# Patient Record
Sex: FEMALE | Race: WHITE | NOT HISPANIC OR LATINO | Employment: UNEMPLOYED | URBAN - METROPOLITAN AREA
[De-identification: names, ages, dates, MRNs, and addresses within clinical notes are randomized per-mention and may not be internally consistent; named-entity substitution may affect disease eponyms.]

---

## 2019-07-23 ENCOUNTER — HISTORICAL (OUTPATIENT)
Dept: RADIOLOGY | Facility: HOSPITAL | Age: 39
End: 2019-07-23

## 2019-08-14 LAB
ABS NEUT (OLG): 4.7 X10(3)/MCL (ref 1.5–6.9)
BUN SERPL-MCNC: 10 MG/DL (ref 10–20)
CALCIUM SERPL-MCNC: 9.1 MG/DL (ref 8–10.5)
CHLORIDE SERPL-SCNC: 101 MMOL/L (ref 100–108)
CO2 SERPL-SCNC: 27 MMOL/L (ref 21–35)
CREAT SERPL-MCNC: 0.85 MG/DL (ref 0.7–1.3)
CREAT/UREA NIT SERPL: 12
ERYTHROCYTE [DISTWIDTH] IN BLOOD BY AUTOMATED COUNT: 12.7 % (ref 11.5–17)
GLUCOSE SERPL-MCNC: 179 MG/DL (ref 75–116)
HCT VFR BLD AUTO: 40.1 % (ref 36–48)
HGB BLD-MCNC: 13.6 GM/DL (ref 12–16)
MCH RBC QN AUTO: 32 PG (ref 27–34)
MCHC RBC AUTO-ENTMCNC: 34 GM/DL (ref 31–36)
MCV RBC AUTO: 95 FL (ref 80–99)
PLATELET # BLD AUTO: 230 X10(3)/MCL (ref 140–400)
PMV BLD AUTO: 9.4 FL (ref 6.8–10)
POTASSIUM SERPL-SCNC: 3.8 MMOL/L (ref 3.6–5.2)
RBC # BLD AUTO: 4.21 X10(6)/MCL (ref 4.2–5.4)
SODIUM SERPL-SCNC: 138 MMOL/L (ref 135–145)
WBC # SPEC AUTO: 8.6 X10(3)/MCL (ref 4.5–11.5)

## 2019-08-21 ENCOUNTER — HISTORICAL (OUTPATIENT)
Dept: ANESTHESIOLOGY | Facility: HOSPITAL | Age: 39
End: 2019-08-21

## 2019-08-21 LAB
APPEARANCE, UA: CLEAR
B-HCG SERPL QL: NEGATIVE
BILIRUB UR QL STRIP: NEGATIVE
COLOR UR: ABNORMAL
GLUCOSE (UA): 500 MG/DL
HGB UR QL STRIP: NEGATIVE
KETONES UR QL STRIP: NEGATIVE
LEUKOCYTE ESTERASE UR QL STRIP: NEGATIVE
NITRITE UR QL STRIP: NEGATIVE
PH UR STRIP: 6 [PH]
PROT UR QL STRIP: NEGATIVE
SP GR UR STRIP: >=1.03
UROBILINOGEN UR STRIP-ACNC: 0.2 EU/DL

## 2021-01-25 LAB — POC BETA-HCG (QUAL): NEGATIVE

## 2021-10-23 ENCOUNTER — HISTORICAL (OUTPATIENT)
Dept: ADMINISTRATIVE | Facility: HOSPITAL | Age: 41
End: 2021-10-23

## 2022-01-25 LAB — PAP RECOMMENDATION EXT: NORMAL

## 2022-04-10 ENCOUNTER — HISTORICAL (OUTPATIENT)
Dept: ADMINISTRATIVE | Facility: HOSPITAL | Age: 42
End: 2022-04-10

## 2022-04-26 VITALS
WEIGHT: 249.13 LBS | BODY MASS INDEX: 41.51 KG/M2 | HEIGHT: 65 IN | DIASTOLIC BLOOD PRESSURE: 70 MMHG | SYSTOLIC BLOOD PRESSURE: 131 MMHG

## 2022-09-20 ENCOUNTER — HISTORICAL (OUTPATIENT)
Dept: ADMINISTRATIVE | Facility: HOSPITAL | Age: 42
End: 2022-09-20

## 2023-01-26 ENCOUNTER — DOCUMENTATION ONLY (OUTPATIENT)
Dept: ADMINISTRATIVE | Facility: HOSPITAL | Age: 43
End: 2023-01-26
Payer: MEDICAID

## 2023-02-10 DIAGNOSIS — R76.8 POSITIVE ANA (ANTINUCLEAR ANTIBODY): Primary | ICD-10-CM

## 2023-03-02 ENCOUNTER — DOCUMENTATION ONLY (OUTPATIENT)
Dept: OBSTETRICS AND GYNECOLOGY | Facility: CLINIC | Age: 43
End: 2023-03-02

## 2023-03-02 ENCOUNTER — OFFICE VISIT (OUTPATIENT)
Dept: OBSTETRICS AND GYNECOLOGY | Facility: CLINIC | Age: 43
End: 2023-03-02
Payer: MEDICAID

## 2023-03-02 VITALS
SYSTOLIC BLOOD PRESSURE: 132 MMHG | HEART RATE: 66 BPM | RESPIRATION RATE: 18 BRPM | BODY MASS INDEX: 42.42 KG/M2 | HEIGHT: 65 IN | WEIGHT: 254.63 LBS | DIASTOLIC BLOOD PRESSURE: 80 MMHG | OXYGEN SATURATION: 97 %

## 2023-03-02 DIAGNOSIS — N39.46 MIXED INCONTINENCE URGE AND STRESS: ICD-10-CM

## 2023-03-02 DIAGNOSIS — Z01.419 WOMEN'S ANNUAL ROUTINE GYNECOLOGICAL EXAMINATION: Primary | ICD-10-CM

## 2023-03-02 PROCEDURE — 99396 PR PREVENTIVE VISIT,EST,40-64: ICD-10-PCS | Mod: ,,, | Performed by: OBSTETRICS & GYNECOLOGY

## 2023-03-02 PROCEDURE — 3008F BODY MASS INDEX DOCD: CPT | Mod: CPTII,,, | Performed by: OBSTETRICS & GYNECOLOGY

## 2023-03-02 PROCEDURE — 3075F SYST BP GE 130 - 139MM HG: CPT | Mod: CPTII,,, | Performed by: OBSTETRICS & GYNECOLOGY

## 2023-03-02 PROCEDURE — 3075F PR MOST RECENT SYSTOLIC BLOOD PRESS GE 130-139MM HG: ICD-10-PCS | Mod: CPTII,,, | Performed by: OBSTETRICS & GYNECOLOGY

## 2023-03-02 PROCEDURE — 3008F PR BODY MASS INDEX (BMI) DOCUMENTED: ICD-10-PCS | Mod: CPTII,,, | Performed by: OBSTETRICS & GYNECOLOGY

## 2023-03-02 PROCEDURE — 99396 PREV VISIT EST AGE 40-64: CPT | Mod: ,,, | Performed by: OBSTETRICS & GYNECOLOGY

## 2023-03-02 PROCEDURE — 1159F PR MEDICATION LIST DOCUMENTED IN MEDICAL RECORD: ICD-10-PCS | Mod: CPTII,,, | Performed by: OBSTETRICS & GYNECOLOGY

## 2023-03-02 PROCEDURE — 4010F ACE/ARB THERAPY RXD/TAKEN: CPT | Mod: CPTII,,, | Performed by: OBSTETRICS & GYNECOLOGY

## 2023-03-02 PROCEDURE — 1160F RVW MEDS BY RX/DR IN RCRD: CPT | Mod: CPTII,,, | Performed by: OBSTETRICS & GYNECOLOGY

## 2023-03-02 PROCEDURE — 1160F PR REVIEW ALL MEDS BY PRESCRIBER/CLIN PHARMACIST DOCUMENTED: ICD-10-PCS | Mod: CPTII,,, | Performed by: OBSTETRICS & GYNECOLOGY

## 2023-03-02 PROCEDURE — 1159F MED LIST DOCD IN RCRD: CPT | Mod: CPTII,,, | Performed by: OBSTETRICS & GYNECOLOGY

## 2023-03-02 PROCEDURE — 3079F DIAST BP 80-89 MM HG: CPT | Mod: CPTII,,, | Performed by: OBSTETRICS & GYNECOLOGY

## 2023-03-02 PROCEDURE — 3079F PR MOST RECENT DIASTOLIC BLOOD PRESSURE 80-89 MM HG: ICD-10-PCS | Mod: CPTII,,, | Performed by: OBSTETRICS & GYNECOLOGY

## 2023-03-02 PROCEDURE — 87491 CHLMYD TRACH DNA AMP PROBE: CPT | Performed by: OBSTETRICS & GYNECOLOGY

## 2023-03-02 PROCEDURE — 4010F PR ACE/ARB THEARPY RXD/TAKEN: ICD-10-PCS | Mod: CPTII,,, | Performed by: OBSTETRICS & GYNECOLOGY

## 2023-03-02 RX ORDER — CETIRIZINE HYDROCHLORIDE 10 MG/1
10 TABLET ORAL
COMMUNITY
Start: 2022-09-16 | End: 2024-03-20

## 2023-03-02 RX ORDER — TRAZODONE HYDROCHLORIDE 100 MG/1
200 TABLET ORAL NIGHTLY
COMMUNITY
Start: 2023-02-27

## 2023-03-02 RX ORDER — AMLODIPINE BESYLATE 10 MG/1
10 TABLET ORAL
COMMUNITY
Start: 2023-02-27

## 2023-03-02 RX ORDER — METFORMIN HYDROCHLORIDE 500 MG/1
500 TABLET, EXTENDED RELEASE ORAL NIGHTLY
COMMUNITY
Start: 2023-02-08

## 2023-03-02 RX ORDER — METOPROLOL SUCCINATE 25 MG/1
25 TABLET, EXTENDED RELEASE ORAL
COMMUNITY
Start: 2023-02-08

## 2023-03-02 RX ORDER — OMEPRAZOLE 40 MG/1
40 CAPSULE, DELAYED RELEASE ORAL
COMMUNITY
Start: 2023-02-09

## 2023-03-02 RX ORDER — LOSARTAN POTASSIUM 100 MG/1
100 TABLET ORAL
COMMUNITY
Start: 2023-02-27

## 2023-03-02 RX ORDER — ASENAPINE MALEATE 5 MG/1
TABLET SUBLINGUAL
COMMUNITY
Start: 2023-02-14

## 2023-03-02 RX ORDER — ATORVASTATIN CALCIUM 10 MG/1
10 TABLET, FILM COATED ORAL
COMMUNITY
Start: 2023-02-09

## 2023-03-02 RX ORDER — CLONAZEPAM 1 MG/1
1.5 TABLET ORAL 2 TIMES DAILY
COMMUNITY
Start: 2023-02-27

## 2023-03-02 RX ORDER — VORTIOXETINE 10 MG/1
1 TABLET, FILM COATED ORAL EVERY MORNING
COMMUNITY
Start: 2023-02-08

## 2023-03-02 RX ORDER — BREXPIPRAZOLE 2 MG/1
1 TABLET ORAL
COMMUNITY
Start: 2023-02-03

## 2023-03-02 RX ORDER — FAMOTIDINE 40 MG/1
40 TABLET, FILM COATED ORAL NIGHTLY
COMMUNITY
Start: 2023-02-08

## 2023-03-02 RX ORDER — BUPROPION HYDROCHLORIDE 300 MG/1
300 TABLET ORAL EVERY MORNING
COMMUNITY
Start: 2023-02-27

## 2023-03-02 RX ORDER — TIRZEPATIDE 2.5 MG/.5ML
INJECTION, SOLUTION SUBCUTANEOUS
COMMUNITY
Start: 2023-02-07 | End: 2024-03-20

## 2023-03-02 RX ORDER — ASENAPINE MALEATE 2.5 MG/1
1 TABLET SUBLINGUAL
COMMUNITY
Start: 2023-02-17

## 2023-03-02 NOTE — PROGRESS NOTES
Patient ID: [id number]   Chief Complaint: Annual exam  Chief Complaint   Patient presents with    Well Woman     Present to clinic for annual exam . C/o urinary stress incontinence.       HPI:   Maricel Richards is a 42 y.o. year old  here for her Annual Exam. No LMP recorded. Patient has had an ablation.  C/O urinary stress incontinence, when coughs, sneezes, laughs. But also, he now had urge symptoms, too. She will often have accidents if she cannot get to the bathroom in time.  Denies any uti symptoms.    Past Medical History:   Diagnosis Date    Diabetes mellitus     Hypertension     Mental disorder      Past Surgical History:   Procedure Laterality Date    ABLATION OF ENDOMETRIUM USING RESECTOSCOPE      tonsilectomy      TUBAL LIGATION       Social History     Tobacco Use    Smoking status: Former     Types: Cigarettes    Smokeless tobacco: Former   Substance Use Topics    Drug use: Never     Family History   Problem Relation Age of Onset    ALS Father     Hypertension Mother     Heart disease Mother      OB History    Para Term  AB Living   2 2 2     2   SAB IAB Ectopic Multiple Live Births           2      # Outcome Date GA Lbr Ramon/2nd Weight Sex Delivery Anes PTL Lv   2 Term 07/10/11 38w0d  3.459 kg (7 lb 10 oz) M Vag-Spont None  PEPITO   1 Term 00 38w0d  1.814 kg (4 lb) F Vag-Spont None  PEPITO       Current Outpatient Medications:     amLODIPine (NORVASC) 10 MG tablet, Take 10 mg by mouth., Disp: , Rfl:     asenapine maleate 2.5 mg Subl, Place 1 tablet under the tongue., Disp: , Rfl:     atorvastatin (LIPITOR) 10 MG tablet, Take 10 mg by mouth., Disp: , Rfl:     buPROPion (WELLBUTRIN XL) 300 MG 24 hr tablet, Take 300 mg by mouth every morning., Disp: , Rfl:     cetirizine (ZYRTEC) 10 MG tablet, Take 10 mg by mouth., Disp: , Rfl:     clonazePAM (KLONOPIN) 1 MG tablet, Take 1.5 mg by mouth 2 (two) times daily., Disp: , Rfl:     famotidine (PEPCID) 40 MG tablet, Take 40 mg by mouth every  evening., Disp: , Rfl:     losartan (COZAAR) 100 MG tablet, Take 100 mg by mouth., Disp: , Rfl:     metFORMIN (GLUCOPHAGE-XR) 500 MG ER 24hr tablet, Take 500 mg by mouth every evening., Disp: , Rfl:     metoprolol succinate (TOPROL-XL) 25 MG 24 hr tablet, Take 25 mg by mouth., Disp: , Rfl:     MOUNJARO 2.5 mg/0.5 mL PnIj, SMARTSI.5 Milligram(s) SUB-Q Once a Week, Disp: , Rfl:     omeprazole (PRILOSEC) 40 MG capsule, Take 40 mg by mouth., Disp: , Rfl:     REXULTI 2 mg Tab, Take 1 tablet by mouth., Disp: , Rfl:     SAPHRIS 5 mg Subl, SMARTSI Tablet(s) Sublingual Every Evening, Disp: , Rfl:     traZODone (DESYREL) 100 MG tablet, Take 200 mg by mouth every evening., Disp: , Rfl:     TRINTELLIX 10 mg Tab, Take 1 tablet by mouth every morning., Disp: , Rfl:     Subjective:   Review of Systems:   Review of Systems  General/Constitutional:  Hot flash denies . Chills denies. Fatigue/weakness denies . Fever denies . Night sweats denies .  Respiratory:  Cough denies . Hemoptysis denies . SOB denies . Sputum production denies . Wheezing denies .  Breast:  Changes in skin of nipple or breast denies . Breast lump denies. Breast pain denies. Nipple discharge denies .  Cardiovascular:  Chest pain denies . Dizziness denies . Palpitations denies . Swelling in hands/feet denies .   Gastrointestinal:  Abdominal pain denies . Blood in stool denies . Constipation denies . Diarrhea denies . Heartburn denies . Nausea denies . Vomiting denies .  Women Only:  Vaginal bleeding denies . Vaginal discharge/ Odor denies . Vaginal lesion/pain denies Breakthrough bleeding denies . Pelvic pain denies . Decreased libido denies. Vulvar lesion/ulcer denies . Prolapse of genital organs denies . Heavy bleeding during menses denies. Irregular menses denies . Painful intercourse denies . Painful menses denies .    Genitourinary:  Incontinence--admits . Blood in urine denies. Frequest urination denies . Painful urination  "denies.    Musculoskeletal:  Joint/arsenio pain denies. Decreased ROM denies. Muscle aches denies. Swollen joints denies . Weakness denies.  Neurologic:  Confusion denies. Trouble walking denies. Trouble with balance denies Balance difficulty denies. Gait abnormalities denies. Headache denies. Tingling/Numbness denies.  Psychiatric:  [place] denies. Homicidal thoughts denies. Mood lability denies. Personality changes denies. Anxiety or panic attacks denies. Auditory/visual hallucinations denies. Delusions denies. Depressed mood denies. Suicidal thoughts denies.    Objective:   Visit Vitals  /80 (BP Location: Left arm)   Pulse 66   Resp 18   Ht 5' 5" (1.651 m)   Wt 115.5 kg (254 lb 10.1 oz)   SpO2 97%   BMI 42.37 kg/m²     Physical Exam:  Physical Exam  Constitutional:  General Appearance : alert, in no acute distress, normal, well nourished.  Neck/Thyroid:  Inspection/Palpation: normal. Thyroid: normal size and shape.  Respiratory:  Auscultation: clear to auscultation bilaterally. Respiratory Effort: normal.  Breast:  Right: Inspection/palpation: no discharge, no masses present, no nipple retraction, no skin changes, no skin dimpling, no tenderness, no lymphadenopathy, no axillary mass, no axillary tenderness.  Left: Inspection/palpation: no discharge, no masses present, no nipple retraction, no skin changes, no skin dimpling, no tenderness, no lymphadenopathy, no axillary mass, no axillary tenderness.  Gastrointestinal:  Abdomen: no masses. no tender, nondistended.  Liver and spleen: normal  Hernias: no hernias present, no inguinal adenopathy.  Genitourinary:  External Genitalia: normal, no lesions.  Vagina: normal appearance, no abnormal discharge, no lesions.  Bladder: no mass, nontender.  Urethra: no erythema or lesions present.  Cervix: no lesions, non tender. Pap and Gen Probe collected  Uterus: nontender, normal contour, normal mobility, normal size.   Adnexa: no masses, no tenderness.  Anus and Perineum: " visually normal.   Chaperone Present  Assessment/Plan:   Assessment:     Women's annual routine gynecological examination  -     Pap IG (Image Guided)  -     C.trach/N.gonor AMP RNA  -     Mammo Digital Screening Bilat; Future  -     Mammo Digital Screening Bilat    Mixed incontinence urge and stress  -     Ambulatory referral/consult to Urology; Future      Follow up in about 1 year (around 3/2/2024) for ANNUAL. In addition to their scheduled FU, the patient has also been instructed to follow up on as needed basis    All questions were answered and the patient voiced understanding of the above issues.

## 2023-03-06 ENCOUNTER — DOCUMENTATION ONLY (OUTPATIENT)
Dept: OBSTETRICS AND GYNECOLOGY | Facility: CLINIC | Age: 43
End: 2023-03-06
Payer: MEDICAID

## 2023-03-06 ENCOUNTER — TELEPHONE (OUTPATIENT)
Dept: OBSTETRICS AND GYNECOLOGY | Facility: CLINIC | Age: 43
End: 2023-03-06
Payer: MEDICAID

## 2023-03-06 LAB
C TRACH RRNA SPEC QL NAA+PROBE: NEGATIVE
CYTOLOGIST CVX/VAG CYTO: NORMAL
CYTOLOGY CVX/VAG DOC CYTO: NORMAL
CYTOLOGY CVX/VAG DOC THIN PREP: NORMAL
DX ICD CODE: NORMAL
Lab: NORMAL
N GONORRHOEA RRNA SPEC QL NAA+PROBE: NEGATIVE
OTHER STN SPEC: NORMAL
SPECIMEN SOURCE: NORMAL
SPECIMEN SOURCE: NORMAL
STAT OF ADQ CVX/VAG CYTO-IMP: NORMAL

## 2023-03-06 NOTE — TELEPHONE ENCOUNTER
Called pt to inform Dr Emil Ocampo does not see Medicaid pt outside [facility]. Pt will need to call ins to find out who is on her plan and call us back for us to refer. Unable to leave .

## 2023-03-20 ENCOUNTER — DOCUMENTATION ONLY (OUTPATIENT)
Dept: OBSTETRICS AND GYNECOLOGY | Facility: CLINIC | Age: 43
End: 2023-03-20
Payer: MEDICAID

## 2023-08-16 DIAGNOSIS — R76.8 POSITIVE ANA (ANTINUCLEAR ANTIBODY): Primary | ICD-10-CM

## 2024-03-20 ENCOUNTER — OFFICE VISIT (OUTPATIENT)
Dept: OBSTETRICS AND GYNECOLOGY | Facility: CLINIC | Age: 44
End: 2024-03-20
Payer: MEDICAID

## 2024-03-20 VITALS
HEART RATE: 73 BPM | DIASTOLIC BLOOD PRESSURE: 82 MMHG | BODY MASS INDEX: 37.21 KG/M2 | WEIGHT: 223.63 LBS | SYSTOLIC BLOOD PRESSURE: 122 MMHG

## 2024-03-20 DIAGNOSIS — Z12.39 ENCOUNTER FOR SCREENING FOR MALIGNANT NEOPLASM OF BREAST, UNSPECIFIED SCREENING MODALITY: ICD-10-CM

## 2024-03-20 DIAGNOSIS — Z01.419 ENCOUNTER FOR WELL WOMAN EXAM WITH ROUTINE GYNECOLOGICAL EXAM: Primary | ICD-10-CM

## 2024-03-20 DIAGNOSIS — Z11.3 SCREENING FOR STDS (SEXUALLY TRANSMITTED DISEASES): ICD-10-CM

## 2024-03-20 DIAGNOSIS — N93.9 VAGINAL BLEEDING: ICD-10-CM

## 2024-03-20 DIAGNOSIS — N39.3 STRESS INCONTINENCE: ICD-10-CM

## 2024-03-20 PROCEDURE — 1159F MED LIST DOCD IN RCRD: CPT | Mod: CPTII,,, | Performed by: OBSTETRICS & GYNECOLOGY

## 2024-03-20 PROCEDURE — 3008F BODY MASS INDEX DOCD: CPT | Mod: CPTII,,, | Performed by: OBSTETRICS & GYNECOLOGY

## 2024-03-20 PROCEDURE — 1160F RVW MEDS BY RX/DR IN RCRD: CPT | Mod: CPTII,,, | Performed by: OBSTETRICS & GYNECOLOGY

## 2024-03-20 PROCEDURE — 99213 OFFICE O/P EST LOW 20 MIN: CPT | Mod: 25,,, | Performed by: OBSTETRICS & GYNECOLOGY

## 2024-03-20 PROCEDURE — 3079F DIAST BP 80-89 MM HG: CPT | Mod: CPTII,,, | Performed by: OBSTETRICS & GYNECOLOGY

## 2024-03-20 PROCEDURE — 3074F SYST BP LT 130 MM HG: CPT | Mod: CPTII,,, | Performed by: OBSTETRICS & GYNECOLOGY

## 2024-03-20 PROCEDURE — 4010F ACE/ARB THERAPY RXD/TAKEN: CPT | Mod: CPTII,,, | Performed by: OBSTETRICS & GYNECOLOGY

## 2024-03-20 PROCEDURE — 99396 PREV VISIT EST AGE 40-64: CPT | Mod: ,,, | Performed by: OBSTETRICS & GYNECOLOGY

## 2024-03-20 RX ORDER — TIRZEPATIDE 15 MG/.5ML
15 INJECTION, SOLUTION SUBCUTANEOUS
COMMUNITY
End: 2024-06-10

## 2024-03-20 NOTE — PROGRESS NOTES
Patient ID: [id number]   Chief Complaint: Annual exam  Chief Complaint   Patient presents with    Well Woman     PRESENTS TO [facility] FOR ANNUAL EXAM.  HAVING ISSUE WITH STRESS INCONTINENCE     HPI:   Maricel Richards is a 43 y.o. year old  here for her Annual Exam. No LMP recorded. Patient has had an ablation. She is doing well. Denies any health changes.   PRESENTS TO [facility] FOR ANNUAL EXAM. STATES SHE HAS TO WEAR A PANTY LINER BECAUSE WHEN SHE COUGHS, SHE WILL URINATE ON HERSELF SOME DUE TO WEAK PELVIC FLOOR MUSCLES.   Subjective:     Past Medical History:   Diagnosis Date    Diabetes mellitus     Hypertension     Mental disorder      Past Surgical History:   Procedure Laterality Date    ABLATION OF ENDOMETRIUM USING RESECTOSCOPE      tonsilectomy      TUBAL LIGATION       Social History     Tobacco Use    Smoking status: Former     Types: Cigarettes    Smokeless tobacco: Former   Substance Use Topics    Alcohol use: Yes     Comment: social    Drug use: Never     Family History   Problem Relation Age of Onset    ALS Father     Hypertension Mother     Heart disease Mother      OB History    Para Term  AB Living   2 2 2     2   SAB IAB Ectopic Multiple Live Births           2      # Outcome Date GA Lbr Ramon/2nd Weight Sex Delivery Anes PTL Lv   2 Term 07/10/11 38w0d  3.459 kg (7 lb 10 oz) M Vag-Spont None  PEPITO   1 Term 00 38w0d  1.814 kg (4 lb) F Vag-Spont None  PEPITO       Current Outpatient Medications:     amLODIPine (NORVASC) 10 MG tablet, Take 10 mg by mouth., Disp: , Rfl:     asenapine maleate 2.5 mg Subl, Place 1 tablet under the tongue., Disp: , Rfl:     atorvastatin (LIPITOR) 10 MG tablet, Take 10 mg by mouth., Disp: , Rfl:     buPROPion (WELLBUTRIN XL) 300 MG 24 hr tablet, Take 300 mg by mouth every morning., Disp: , Rfl:     clonazePAM (KLONOPIN) 1 MG tablet, Take 1.5 mg by mouth 2 (two) times daily., Disp: , Rfl:     famotidine (PEPCID) 40 MG tablet, Take 40 mg by mouth every evening.,  Disp: , Rfl:     losartan (COZAAR) 100 MG tablet, Take 100 mg by mouth., Disp: , Rfl:     metFORMIN (GLUCOPHAGE-XR) 500 MG ER 24hr tablet, Take 500 mg by mouth every evening., Disp: , Rfl:     metoprolol succinate (TOPROL-XL) 25 MG 24 hr tablet, Take 25 mg by mouth., Disp: , Rfl:     omeprazole (PRILOSEC) 40 MG capsule, Take 40 mg by mouth., Disp: , Rfl:     REXULTI 2 mg Tab, Take 1 tablet by mouth., Disp: , Rfl:     SAPHRIS 5 mg Subl, SMARTSI Tablet(s) Sublingual Every Evening, Disp: , Rfl:     tirzepatide (MOUNJARO) 15 mg/0.5 mL PnIj, Inject 15 mg into the skin every 7 days., Disp: , Rfl:     traZODone (DESYREL) 100 MG tablet, Take 200 mg by mouth every evening., Disp: , Rfl:     TRINTELLIX 10 mg Tab, Take 1 tablet by mouth every morning., Disp: , Rfl:   MENARCHEAL:  DOESN'T CYCLE, ABLATION APPROX 3 YEARS AGO  PAP:  Last PAP: 3/2/23  History of Abnormal PAP Smear: NO  HPV Vaccine: NO  INTERCOURSE:  Dyspareunia: No  Postcoital Bleeding: No  History of STI: Yes   If yes, then: TV APPROX 3-4 YEARS AGO  Current Birth Control Method: tubal ligation  Sexually Active: no  BREAST HISTORY:   Last Mammogram: 3/16/23  History of Abnormal Mammogram: YES: us OF BREAST-NORMAL     MENOPAUSE:  Post Menopausal Bleeding: No  Hormone Replacement Therapy: No  COLONOSCOPY:  Last Colonoscopy:  NA    Review of Systems 12 point review of systems conducted, negative except as stated in the history of present illness.   See HPI for details.  Objective:   Visit Vitals  /82   Pulse 73   Wt 101.4 kg (223 lb 9.6 oz)   BMI 37.21 kg/m²     No results found for this or any previous visit (from the past 24 hour(s)).  Physical Exam:  Chaperone present for exam.  Physical Exam  Constitutional:  General Appearance : alert, in no acute distress, normal, well nourished.  Cardiovascular:   Regular rate and rhythm.  Respiratory:  Respiratory Effort: normal.  Breast:  Right: Inspection/palpation: no discharge, no masses present, no nipple  retraction, no skin changes, no skin dimpling, no tenderness, no lymphadenopathy, no axillary mass, no axillary tenderness.  Left: Inspection/palpation: no discharge, no masses present, no nipple retraction, no skin changes, no skin dimpling, no tenderness, no lymphadenopathy, no axillary mass, no axillary tenderness.  Gastrointestinal:  Abdomen: no masses. no tender, nondistended.  Hernias: no hernias present.  Genitourinary:  External Genitalia: normal, no lesions.  Vagina: normal appearance, no abnormal discharge, no lesions.  Bladder: no mass, nontender.  Urethra: no erythema or lesions present.  Cervix: no lesions, non tender. Pap Done STD TESTING VIA PAP  Uterus: nontender, normal contour, normal mobility, normal size.   Adnexa: no masses, no tenderness.  Anus and Perineum: visually normal.     No results found for this or any previous visit (from the past 24 hour(s)).  Assessment/Plan:   Assessment:   Encounter for screening for malignant neoplasm of breast, unspecified screening modality  -     Mammo Digital Screening Bilat; Future; Expected date: 03/20/2024    Encounter for well woman exam with routine gynecological exam  -     Cancel: Liquid-Based Pap Smear, Screening Screening; Future; Expected date: 03/20/2024  -     Liquid-Based Pap Smear, Screening Screening    Screening for STDs (sexually transmitted diseases)  -     Liquid-Based Pap Smear, Screening Screening    Vaginal bleeding  -     MDL Sendout Test    Stress incontinence  Refer to physical therapy for pelvic floor strengthening.    Follow up in about 1 year (around 3/20/2025) for ANNUAL EXAM.   In addition to their scheduled follow-up, the patient has also been instructed to follow up on as needed basis.   All questions were answered and the patient voiced understanding of the above issues.

## 2024-03-21 ENCOUNTER — DOCUMENTATION ONLY (OUTPATIENT)
Dept: OBSTETRICS AND GYNECOLOGY | Facility: CLINIC | Age: 44
End: 2024-03-21
Payer: MEDICAID

## 2024-03-21 NOTE — PROGRESS NOTES
Referral faxed to [facility] for pelvic floor strengthening. Confirmation received.

## 2024-03-25 LAB — PSYCHE PATHOLOGY RESULT: NORMAL

## 2024-06-10 ENCOUNTER — OFFICE VISIT (OUTPATIENT)
Dept: RHEUMATOLOGY | Facility: CLINIC | Age: 44
End: 2024-06-10
Payer: MEDICAID

## 2024-06-10 ENCOUNTER — HOSPITAL ENCOUNTER (OUTPATIENT)
Dept: RADIOLOGY | Facility: HOSPITAL | Age: 44
Discharge: HOME | End: 2024-06-10
Attending: INTERNAL MEDICINE
Payer: MEDICAID

## 2024-06-10 VITALS
DIASTOLIC BLOOD PRESSURE: 82 MMHG | BODY MASS INDEX: 35.4 KG/M2 | HEART RATE: 66 BPM | SYSTOLIC BLOOD PRESSURE: 130 MMHG | RESPIRATION RATE: 18 BRPM | HEIGHT: 65 IN | TEMPERATURE: 98 F | WEIGHT: 212.5 LBS | OXYGEN SATURATION: 100 %

## 2024-06-10 DIAGNOSIS — I10 PRIMARY HYPERTENSION: ICD-10-CM

## 2024-06-10 DIAGNOSIS — M35.01 SJOGREN SYNDROME WITH KERATOCONJUNCTIVITIS: Primary | ICD-10-CM

## 2024-06-10 DIAGNOSIS — R79.89 ELEVATED LFTS: ICD-10-CM

## 2024-06-10 DIAGNOSIS — R76.8 POSITIVE ANA (ANTINUCLEAR ANTIBODY): ICD-10-CM

## 2024-06-10 DIAGNOSIS — K21.9 GASTROESOPHAGEAL REFLUX DISEASE, UNSPECIFIED WHETHER ESOPHAGITIS PRESENT: ICD-10-CM

## 2024-06-10 DIAGNOSIS — Z79.899 HIGH RISK MEDICATION USE: ICD-10-CM

## 2024-06-10 DIAGNOSIS — E11.9 TYPE 2 DIABETES MELLITUS WITHOUT COMPLICATION, WITHOUT LONG-TERM CURRENT USE OF INSULIN: ICD-10-CM

## 2024-06-10 DIAGNOSIS — F31.9 BIPOLAR AFFECTIVE DISORDER, REMISSION STATUS UNSPECIFIED: ICD-10-CM

## 2024-06-10 DIAGNOSIS — F32.A DEPRESSION, UNSPECIFIED DEPRESSION TYPE: ICD-10-CM

## 2024-06-10 PROBLEM — F41.9 ANXIETY: Status: ACTIVE | Noted: 2024-06-10

## 2024-06-10 PROBLEM — E78.00 HYPERCHOLESTEROLEMIA: Status: ACTIVE | Noted: 2024-06-10

## 2024-06-10 PROCEDURE — 1159F MED LIST DOCD IN RCRD: CPT | Mod: CPTII,,, | Performed by: INTERNAL MEDICINE

## 2024-06-10 PROCEDURE — 3075F SYST BP GE 130 - 139MM HG: CPT | Mod: CPTII,,, | Performed by: INTERNAL MEDICINE

## 2024-06-10 PROCEDURE — 71046 X-RAY EXAM CHEST 2 VIEWS: CPT | Mod: TC

## 2024-06-10 PROCEDURE — 99215 OFFICE O/P EST HI 40 MIN: CPT | Mod: PBBFAC,25 | Performed by: INTERNAL MEDICINE

## 2024-06-10 PROCEDURE — 3008F BODY MASS INDEX DOCD: CPT | Mod: CPTII,,, | Performed by: INTERNAL MEDICINE

## 2024-06-10 PROCEDURE — 83861 MICROFLUID ANALY TEARS: CPT | Mod: 25,PBBFAC | Performed by: INTERNAL MEDICINE

## 2024-06-10 PROCEDURE — 3079F DIAST BP 80-89 MM HG: CPT | Mod: CPTII,,, | Performed by: INTERNAL MEDICINE

## 2024-06-10 PROCEDURE — 4010F ACE/ARB THERAPY RXD/TAKEN: CPT | Mod: CPTII,,, | Performed by: INTERNAL MEDICINE

## 2024-06-10 PROCEDURE — 99205 OFFICE O/P NEW HI 60 MIN: CPT | Mod: S$PBB,,, | Performed by: INTERNAL MEDICINE

## 2024-06-10 RX ORDER — AZATHIOPRINE 50 MG/1
50 TABLET ORAL DAILY
COMMUNITY

## 2024-06-10 RX ORDER — HYDROXYCHLOROQUINE SULFATE 200 MG/1
200 TABLET, FILM COATED ORAL 2 TIMES DAILY
Qty: 60 TABLET | Refills: 6 | Status: SHIPPED | OUTPATIENT
Start: 2024-06-10

## 2024-06-10 RX ORDER — DEXTROAMPHETAMINE SULFATE, DEXTROAMPHETAMINE SACCHARATE, AMPHETAMINE SULFATE AND AMPHETAMINE ASPARTATE 5; 5; 5; 5 MG/1; MG/1; MG/1; MG/1
20 CAPSULE, EXTENDED RELEASE ORAL EVERY MORNING
COMMUNITY
Start: 2024-05-17

## 2024-06-10 NOTE — PROGRESS NOTES
Patient ID: [id number]     Chief Complaint: Positive CONRAD (Pt states she has been dealing with numbness to her jasen hands, and hair loss)      Referred By: Paper Order     HPI:     Maricel Richards is a 43 y.o. female here today for a new patient visit.      She had seen Arnaldo, for elevated LFT's, for 2021. Work up showed positive CONRAD so she was referred to me further eval.   Dr Raya started her on Imuran 50 mg daily. She is tolerating it well with out issues. She never had liver biopsy done and was told she has fatty liver. She was not sure if she had hepatic US or not.   Alcohol intake: 1-2 bloody krystian's in 3-4 months.     C/o generalized hair thinning.   C/o tingling and numbness in bialteral hands, worse at night and with activities, like typing.   C/o dry eyes and dry mouth, recently got insurance planning to see dentist.     Denies history of fevers, rashes, photosensitivity, oral or nasal ulcers, h/o MI, stroke, seizures, h/o PE or DVT, Raynaud's phenomenon, uveitis, malignancies.   Family history of autoimmune disease:none  Pregnancies:2   Miscarriages: none  Smoking: quit around summer 2023. 2 packs a day for 2 years and pack a day for 10 years.       Social History     Tobacco Use   Smoking Status Former    Types: Cigarettes   Smokeless Tobacco Former          -------------------------------------    Anxiety disorder, unspecified    Diabetes mellitus    Fatty liver    High cholesterol    Hypertension    Mental disorder        Past Surgical History:   Procedure Laterality Date    ABLATION OF ENDOMETRIUM USING RESECTOSCOPE      tonsilectomy      TUBAL LIGATION         Review of patient's allergies indicates:  No Known Allergies    Outpatient Medications Marked as Taking for the 6/10/24 encounter (Office Visit) with Telly Caceres MD   Medication Sig Dispense Refill    ADDERALL XR 20 mg 24 hr capsule Take 20 mg by mouth every morning.      amLODIPine (NORVASC) 10 MG tablet Take 10 mg by mouth.       asenapine maleate 2.5 mg Subl Place 1 tablet under the tongue 2 (two) times daily.      atorvastatin (LIPITOR) 10 MG tablet Take 10 mg by mouth once daily.      azaTHIOprine (IMURAN) 50 mg Tab Take 50 mg by mouth once daily.      buPROPion (WELLBUTRIN XL) 300 MG 24 hr tablet Take 300 mg by mouth every morning.      clonazePAM (KLONOPIN) 1 MG tablet Take 1.5 mg by mouth 2 (two) times daily.      losartan (COZAAR) 100 MG tablet Take 100 mg by mouth once daily.      metFORMIN (GLUCOPHAGE-XR) 500 MG ER 24hr tablet Take 500 mg by mouth every evening.      metoprolol succinate (TOPROL-XL) 25 MG 24 hr tablet Take 25 mg by mouth every evening.      omeprazole (PRILOSEC) 40 MG capsule Take 40 mg by mouth every morning.      REXULTI 2 mg Tab Take 1 tablet by mouth once daily.      traZODone (DESYREL) 100 MG tablet Take 200 mg by mouth every evening.      TRINTELLIX 10 mg Tab Take 1 tablet by mouth every morning.         Social History     Socioeconomic History    Marital status: Single   Tobacco Use    Smoking status: Former     Types: Cigarettes    Smokeless tobacco: Former   Substance and Sexual Activity    Alcohol use: Yes     Comment: social    Drug use: Never    Sexual activity: Not Currently     Partners: Male     Birth control/protection: See Surgical Hx        Family History   Problem Relation Name Age of Onset    Hypertension Mother      Heart disease Mother      ALS Father          Immunization History   Administered Date(s) Administered    COVID-19, MRNA, LN-S, PF (MODERNA FULL 0.5 ML DOSE) 07/14/2021, 08/11/2021    DTP 02/28/1980, 05/01/1980, 08/28/1980, 08/12/1982, 04/27/1986    Hepatitis A, Adult 08/12/2009    Hepatitis B, Pediatric/Adolescent 11/10/1998, 12/15/1998, 05/18/1999    Influenza - Quadrivalent 10/04/2021, 09/12/2023    Influenza - Quadrivalent - PF *Preferred* (6 months and older) 10/12/2018    MMR 04/30/1981, 05/18/1999    Meningococcal Conjugate (MCV4P) 05/03/2012    OPV 02/28/1980, 05/01/1980,  "08/28/1980, 08/12/1982, 04/27/1986    Td (ADULT) 11/10/1998    Tdap 06/26/2008    Varicella 05/03/2012       Patient Care Team:  Veronica Joy FNP as PCP - General (Family Medicine)     Subjective:     Constitutional:  Denies chills. Denies fever. Denies night sweats. Denies weight loss.   Ophthalmology: Denies blurred vision. Denies dry eyes. Denies eye pain. Denies Itching and redness.   ENT: Denies oral ulcers. Denies epistaxis. Denies dry mouth. Denies swollen glands.   Endocrine: Admits diabetes. Denies thyroid Problems.   Respiratory: Denies cough. Denies shortness of breath. Denies shortness of breath with exertion. Denies hemoptysis.   Cardiovascular: Denies chest pain at rest. Denies chest pain with exertion. Denies palpitations.    Gastrointestinal: Denies abdominal pain. Denies diarrhea. Denies nausea. Denies vomiting. Denies hematemesis or hematochezia. Denies heartburn.  Genitourinary: Denies blood in urine.  Musculoskeletal: See HPI for details  Integumentary: Denies rash. Denies photosensitivity.   Peripheral Vascular: Denies Ulcers of hands and/or feet. Denies Cold extremities.   Neurologic: Denies dizziness. Denies headache.  Denies loss of strength. Admits numbness or tingling in hands.   Psychiatric: Admits depression. Admits anxiety. Denies suicidal/homicidal ideations.      Objective:     /82 (BP Location: Left arm, Patient Position: Sitting, BP Method: Large (Manual))   Pulse 66   Temp 97.5 °F (36.4 °C)   Resp 18   Ht 5' 5" (1.651 m)   Wt 96.4 kg (212 lb 8.4 oz)   SpO2 100%   BMI 35.37 kg/m²     Physical Exam    General Appearance: alert, pleasant, in no acute distress.  Skin: Skin color, texture, turgor normal. No rashes or lesions.  Eyes:  extraocular movement intact (EOMI), pupils equal, round, reactive to light and accommodation, conjunctiva clear.  ENT: No oral or nasal ulcers. Dry oral mucosa, no lesions.  Neck:  Neck supple. No adenopathy.   Lungs: CTA throughout without " crackles, rhonchi, or wheezes.   Heart: RRR w/o murmurs.  No edema. 2+ DP pulse.  Abdomen: Soft, non-tender, no masses, rebound or guarding.  Neuro: Alert, oriented, CN II-XII GI, sensory and motor innervation intact.  Musculoskeletal: No synovitis on exam. No joint deformities.   Psych: Alert, oriented, normal eye contact.      Labs:     4/2022: CBC ok. , ALT 75. Hep B and C negative. Smooth muscle and mitochondrial ab negative. CONRAD 1:320, speckled. Positive SSA, 112. Negative dsDNA, SSB, RNP, centromere, smith, Scl 70, TPO, chromatin. RF and CCP negative. Cardiolipin IgA 12, normal less than 12. Negative IgG and IgM. C3, C4 ok.     Imaging:     Schirmer's test: 9 cm in right eye and 10 in left eye.     Assessment:       ICD-10-CM ICD-9-CM   1. Sjogren syndrome with keratoconjunctivitis  M35.01 710.2   2. Positive CONRAD (antinuclear antibody)  R76.8 795.79   3. Primary hypertension  I10 401.9   4. Bipolar affective disorder, remission status unspecified  F31.9 296.80   5. Depression, unspecified depression type  F32.A 311   6. Elevated LFTs  R79.89 790.6   7. Gastroesophageal reflux disease, unspecified whether esophagitis present  K21.9 530.81   8. Type 2 diabetes mellitus without complication, without long-term current use of insulin  E11.9 250.00   9. High risk medication use  Z79.899 V58.69        Plan:     1. Sjogren syndrome with keratoconjunctivitis:  Positive CONRAD, SSA.  Other ENAs negative.  Patient has sicca symptoms, hair loss and arthralgia.  Dry oral mucosa on exam, Schirmer's test abnormal.  She has Sjogren's.  Discussed the disease course and treatment options of Sjogren's.  - discussed symptomatic management of sicca symptoms.    - advised to schedule follow-up with Ophthalmology and with dentist.  - start Plaquenil 200 mg b.i.d., discussed the risks and benefits of medication with the patient.  - labs today and chest x-ray.     2. High risk medication use: Persons with rheumatoid arthritis,  lupus, psoriatic arthritis and other autoimmune diseases are at increased risk of cardiovascular disease including heart attack and stroke. We recommend that all patients with these conditions have annual health maintenance exams including lipid measurements, blood pressure measurements, and smoking cessation counseling when applicable at their primary care provider's office.   -Advised to stay up-to-date on age appropriate vaccinations and malignancy screening, including yearly skin exams.    - congratulated her on smoking cessation.  Discussed about continued smoking cessation.     3. Primary hypertension:  Blood pressure okay today, controlled with medications.     4. Bipolar affective disorder and depression:  Controlled with medication, follows with PCP.     5. Elevated LFTs:  She was told she has fatty liver.  She had seen Dr. Edmond Salazar.  Currently on Imuran 50 mg daily.  Will check LFTs today, if continue to be elevated I would recommend liver biopsy.  Order TPMT as she is taking Imuran.     7. Type 2 diabetes mellitus:  Controlled with medications per patient, currently on metformin.  Discussed strict control of diabetes, follow-up with PCP.        Plaquenil(hydroxychloroquine) treatment was discussed with the patient.    Risks and benefits of this medication was explained to the patient.  Discussed side effects including retinal deposits and retinopathy related to plaquenil.  Discussed risk of myopathy, neuropathy and nausea as well.  Instructed patient it is recommended to see an eye doctor for a baseline exam and yearly after that, although eye complications are rare and occur after many years(and are dose related).     Recommend starting Plaquenil at 200mg bid.     Discussed clinical features of Sjogren's syndrome.   Sjogren's syndrome is an autoimmune disease, which is caused by an abnormal overactive immune system attacking the tear duct glands, salivary glands and various other parts of the body.   This can lead to dry eyes, dry mouth, dental caries along with other various symptoms such as neurological problems, joint pains, rashes, hematological abnormalities etc.   Diagnosis is made by evaluation of clinical features, serological abnormalities, and possibly a salivary lip gland biopsy(most specific diagnosis).   Patient's with Sjogren's syndrome are at an increased risk for lymphoma.    Laboratory diagnosis typically includes checking an anti-nuclear antibody, SSA and SSB (sjogren's syndrome A and B) antibody.      Treatments of dry eyes typically involves lubricant eye drops such as restasis drops.  Various treatments of dry mouth include artifical saliva, salivary stimulants (evoxac, salagen), sugar free candy, etc.       Follow up in about 3 months (around 9/10/2024). In addition to their scheduled follow up, the patient has also been instructed to follow up on as needed basis.      Total time spent with patient and documentation is 80 minutes. All questions were answered to patient's satisfaction and patient verbalized understanding.

## 2025-03-13 DIAGNOSIS — M35.01 SJOGREN SYNDROME WITH KERATOCONJUNCTIVITIS: Primary | ICD-10-CM

## 2025-03-13 RX ORDER — HYDROXYCHLOROQUINE SULFATE 200 MG/1
200 TABLET, FILM COATED ORAL 2 TIMES DAILY
Qty: 60 TABLET | Refills: 6 | Status: SHIPPED | OUTPATIENT
Start: 2025-03-13

## 2025-03-27 ENCOUNTER — OFFICE VISIT (OUTPATIENT)
Dept: OBSTETRICS AND GYNECOLOGY | Facility: CLINIC | Age: 45
End: 2025-03-27
Payer: MEDICAID

## 2025-03-27 VITALS
WEIGHT: 187 LBS | BODY MASS INDEX: 31.16 KG/M2 | SYSTOLIC BLOOD PRESSURE: 114 MMHG | HEART RATE: 77 BPM | RESPIRATION RATE: 18 BRPM | DIASTOLIC BLOOD PRESSURE: 80 MMHG | HEIGHT: 65 IN | OXYGEN SATURATION: 98 %

## 2025-03-27 DIAGNOSIS — Z01.419 ENCOUNTER FOR ANNUAL ROUTINE GYNECOLOGICAL EXAMINATION: ICD-10-CM

## 2025-03-27 DIAGNOSIS — Z12.31 ENCOUNTER FOR SCREENING MAMMOGRAM FOR BREAST CANCER: Primary | ICD-10-CM

## 2025-03-27 DIAGNOSIS — Z12.4 ENCOUNTER FOR SCREENING FOR CERVICAL CANCER: ICD-10-CM

## 2025-03-27 PROCEDURE — 87491 CHLMYD TRACH DNA AMP PROBE: CPT | Performed by: OBSTETRICS & GYNECOLOGY

## 2025-03-27 PROCEDURE — 87591 N.GONORRHOEAE DNA AMP PROB: CPT | Performed by: OBSTETRICS & GYNECOLOGY

## 2025-03-27 PROCEDURE — 87661 TRICHOMONAS VAGINALIS AMPLIF: CPT | Performed by: OBSTETRICS & GYNECOLOGY

## 2025-03-27 RX ORDER — TIRZEPATIDE 15 MG/.5ML
15 INJECTION, SOLUTION SUBCUTANEOUS WEEKLY
COMMUNITY
Start: 2025-03-16

## 2025-03-27 NOTE — PROGRESS NOTES
"Patient ID: [id number]   Chief Complaint: Annual exam  Chief Complaint   Patient presents with    Well Woman     No c/o's.      HPI:   Maricel Richards is a 44 y.o. year old  here for her Annual Exam.    No LMP recorded. Patient has had an ablation.   She is doing well. Denies any health changes.   Well Woman (No c/o's. )    Subjective:     Past Medical History:   Diagnosis Date    Anxiety disorder, unspecified     Diabetes mellitus     Fatty liver     High cholesterol     Hypertension     Mental disorder     Sjogren syndrome      Past Surgical History:   Procedure Laterality Date    ABLATION OF ENDOMETRIUM USING RESECTOSCOPE      tonsilectomy      TUBAL LIGATION       Social History[1]  Family History   Problem Relation Name Age of Onset    Hypertension Mother      Heart disease Mother      ALS Father       OB History    Para Term  AB Living   2 2 2   2   SAB IAB Ectopic Multiple Live Births       2      # Outcome Date GA Lbr Ramon/2nd Weight Sex Type Anes PTL Lv   2 Term 07/10/11 38w0d  3.459 kg (7 lb 10 oz) M Vag-Spont None  PEPITO   1 Term 00 38w0d  1.814 kg (4 lb) F Vag-Spont None  PEPITO     Current Medications[2]  MENARCHEAL:  ablation  PAP:  Last PAP: 2024  History of Abnormal PAP Smear: NO  Treated: n/a  HPV Vaccine: NO  INTERCOURSE:  Dyspareunia: No  Postcoital Bleeding: No  History of STI: Yes   If yes, then: TV  Current Birth Control Method: tubal ligation  Sexually Active: no  BREAST HISTORY:   Last Mammogram: 2024  History of Abnormal Mammogram: YES: states" us was done and was neg "     COLONOSCOPY:  Last Colonoscopy:   n/a    Review of Systems 12 point review of systems conducted, negative except as stated in the history of present illness.     See HPI for details.  Objective:   Visit Vitals  /80 (BP Location: Left arm)   Pulse 77   Resp 18   Ht 5' 5" (1.651 m)   Wt 84.8 kg (187 lb)   SpO2 98%   BMI 31.12 kg/m²     No results found for this or any previous visit " (from the past 24 hours).  Physical Exam:  Chaperone present for exam.  Physical Exam  Constitutional:  General Appearance : alert, in no acute distress, normal, well nourished.  Cardiovascular:   Regular rate and rhythm.  Respiratory:  Respiratory Effort: normal.  Breast:  Right: Inspection/palpation: no discharge, no masses present, no nipple retraction, no skin changes, no skin dimpling, no tenderness, no lymphadenopathy, no axillary mass, no axillary tenderness.  Left: Inspection/palpation: no discharge, no masses present, no nipple retraction, no skin changes, no skin dimpling, no tenderness, no lymphadenopathy, no axillary mass, no axillary tenderness.  Gastrointestinal:  Abdomen: no masses. no tender, nondistended.  Genitourinary:  External Genitalia: normal, no lesions.  Vagina: normal appearance, no abnormal discharge, no lesions.  Bladder: no mass, nontender.  Urethra: no erythema or lesions present.  Cervix: no lesions, non tender. Pap/cxs Done  Uterus: nontender, normal contour, normal mobility, normal size.   Adnexa: no masses, no tenderness.  Anus and Perineum: visually normal.     No results found for this or any previous visit (from the past 24 hours).  Assessment/Plan:   Assessment:   Encounter for screening mammogram for breast cancer  -     Mammo Digital Screening Bilat; Future; Expected date: 03/27/2025    Encounter for annual routine gynecological examination  -     Liquid-Based Pap Smear, Screening    Encounter for screening for cervical cancer  -     Liquid-Based Pap Smear, Screening      Follow up in about 1 week (around 4/3/2025) for ANNUAL.     In addition to their scheduled follow-up, the patient has also been instructed to follow up on as needed basis.   All questions were answered and the patient voiced understanding of the above issues.           [1]   Social History  Tobacco Use    Smoking status: Former     Types: Vaping with nicotine     Start date: 2025     Passive exposure: Current     Smokeless tobacco: Former   Substance Use Topics    Alcohol use: Yes     Comment: social    Drug use: Never   [2]   Current Outpatient Medications:     ADDERALL XR 20 mg 24 hr capsule, Take 20 mg by mouth every morning., Disp: , Rfl:     amLODIPine (NORVASC) 10 MG tablet, Take 10 mg by mouth., Disp: , Rfl:     atorvastatin (LIPITOR) 10 MG tablet, Take 10 mg by mouth once daily., Disp: , Rfl:     azaTHIOprine (IMURAN) 50 mg Tab, Take 50 mg by mouth once daily., Disp: , Rfl:     buPROPion (WELLBUTRIN XL) 300 MG 24 hr tablet, Take 300 mg by mouth every morning., Disp: , Rfl:     clonazePAM (KLONOPIN) 1 MG tablet, Take 1.5 mg by mouth 2 (two) times daily., Disp: , Rfl:     hydroxychloroquine (PLAQUENIL) 200 mg tablet, TAKE 1 TABLET (200 MG TOTAL) BY MOUTH 2 (TWO) TIMES DAILY., Disp: 60 tablet, Rfl: 6    losartan (COZAAR) 100 MG tablet, Take 100 mg by mouth once daily., Disp: , Rfl:     metoprolol succinate (TOPROL-XL) 25 MG 24 hr tablet, Take 25 mg by mouth every evening., Disp: , Rfl:     MOUNJARO 15 mg/0.5 mL PnIj, Inject 15 mg into the skin once a week., Disp: , Rfl:     REXULTI 2 mg Tab, Take 1 tablet by mouth once daily., Disp: , Rfl:     traZODone (DESYREL) 100 MG tablet, Take 200 mg by mouth every evening., Disp: , Rfl:     TRINTELLIX 10 mg Tab, Take 1 tablet by mouth every morning., Disp: , Rfl:     asenapine maleate 2.5 mg Subl, Place 1 tablet under the tongue 2 (two) times daily. (Patient not taking: Reported on 3/27/2025), Disp: , Rfl:     metFORMIN (GLUCOPHAGE-XR) 500 MG ER 24hr tablet, Take 500 mg by mouth every evening. (Patient not taking: Reported on 3/27/2025), Disp: , Rfl:     omeprazole (PRILOSEC) 40 MG capsule, Take 40 mg by mouth every morning. (Patient not taking: Reported on 3/27/2025), Disp: , Rfl:

## 2025-04-03 ENCOUNTER — RESULTS FOLLOW-UP (OUTPATIENT)
Dept: OBSTETRICS AND GYNECOLOGY | Facility: CLINIC | Age: 45
End: 2025-04-03
Payer: MEDICAID

## 2025-05-08 ENCOUNTER — LAB VISIT (OUTPATIENT)
Dept: LAB | Facility: HOSPITAL | Age: 45
End: 2025-05-08
Attending: INTERNAL MEDICINE
Payer: COMMERCIAL

## 2025-05-08 ENCOUNTER — OFFICE VISIT (OUTPATIENT)
Dept: RHEUMATOLOGY | Facility: CLINIC | Age: 45
End: 2025-05-08
Payer: COMMERCIAL

## 2025-05-08 VITALS
OXYGEN SATURATION: 98 % | BODY MASS INDEX: 31.26 KG/M2 | TEMPERATURE: 98 F | DIASTOLIC BLOOD PRESSURE: 85 MMHG | HEIGHT: 65 IN | SYSTOLIC BLOOD PRESSURE: 136 MMHG | HEART RATE: 67 BPM | RESPIRATION RATE: 20 BRPM | WEIGHT: 187.63 LBS

## 2025-05-08 DIAGNOSIS — E11.9 TYPE 2 DIABETES MELLITUS WITHOUT COMPLICATION, WITHOUT LONG-TERM CURRENT USE OF INSULIN: ICD-10-CM

## 2025-05-08 DIAGNOSIS — I10 PRIMARY HYPERTENSION: ICD-10-CM

## 2025-05-08 DIAGNOSIS — R79.89 ELEVATED LFTS: ICD-10-CM

## 2025-05-08 DIAGNOSIS — G62.9 NEUROPATHY: ICD-10-CM

## 2025-05-08 DIAGNOSIS — F31.9 BIPOLAR AFFECTIVE DISORDER, REMISSION STATUS UNSPECIFIED: ICD-10-CM

## 2025-05-08 DIAGNOSIS — Z79.899 HIGH RISK MEDICATION USE: ICD-10-CM

## 2025-05-08 DIAGNOSIS — M35.01 SJOGREN SYNDROME WITH KERATOCONJUNCTIVITIS: Primary | ICD-10-CM

## 2025-05-08 DIAGNOSIS — M35.01 SJOGREN SYNDROME WITH KERATOCONJUNCTIVITIS: ICD-10-CM

## 2025-05-08 LAB
ALBUMIN SERPL-MCNC: 3.8 G/DL (ref 3.5–5)
ALBUMIN/GLOB SERPL: 1 RATIO (ref 1.1–2)
ALP SERPL-CCNC: 51 UNIT/L (ref 40–150)
ALT SERPL-CCNC: 10 UNIT/L (ref 0–55)
ANION GAP SERPL CALC-SCNC: 5 MEQ/L
AST SERPL-CCNC: 15 UNIT/L (ref 11–45)
BACTERIA #/AREA URNS AUTO: ABNORMAL /HPF
BASOPHILS # BLD AUTO: 0.07 X10(3)/MCL
BASOPHILS NFR BLD AUTO: 1.1 %
BILIRUB SERPL-MCNC: 0.6 MG/DL
BILIRUB UR QL STRIP.AUTO: NEGATIVE
BUN SERPL-MCNC: 12.4 MG/DL (ref 7–18.7)
CALCIUM SERPL-MCNC: 9 MG/DL (ref 8.4–10.2)
CHLORIDE SERPL-SCNC: 105 MMOL/L (ref 98–107)
CLARITY UR: CLEAR
CO2 SERPL-SCNC: 27 MMOL/L (ref 22–29)
COLOR UR AUTO: YELLOW
CREAT SERPL-MCNC: 0.92 MG/DL (ref 0.55–1.02)
CREAT UR-MCNC: 183.7 MG/DL (ref 45–106)
CREAT/UREA NIT SERPL: 13
CRP SERPL-MCNC: 1.4 MG/L
EOSINOPHIL # BLD AUTO: 0.04 X10(3)/MCL (ref 0–0.9)
EOSINOPHIL NFR BLD AUTO: 0.6 %
ERYTHROCYTE [DISTWIDTH] IN BLOOD BY AUTOMATED COUNT: 12.1 % (ref 11.5–17)
ERYTHROCYTE [SEDIMENTATION RATE] IN BLOOD: 7 MM/HR (ref 0–15)
GFR SERPLBLD CREATININE-BSD FMLA CKD-EPI: >60 ML/MIN/1.73/M2
GLOBULIN SER-MCNC: 3.9 GM/DL (ref 2.4–3.5)
GLUCOSE SERPL-MCNC: 81 MG/DL (ref 74–100)
GLUCOSE UR QL STRIP: NORMAL
HCT VFR BLD AUTO: 37.9 % (ref 37–47)
HGB BLD-MCNC: 12.8 G/DL (ref 12–16)
HGB UR QL STRIP: NEGATIVE
HYALINE CASTS #/AREA URNS LPF: ABNORMAL /LPF
IMM GRANULOCYTES # BLD AUTO: 0.01 X10(3)/MCL (ref 0–0.04)
IMM GRANULOCYTES NFR BLD AUTO: 0.2 %
KETONES UR QL STRIP: NEGATIVE
LEUKOCYTE ESTERASE UR QL STRIP: 250
LYMPHOCYTES # BLD AUTO: 1.89 X10(3)/MCL (ref 0.6–4.6)
LYMPHOCYTES NFR BLD AUTO: 29.1 %
MCH RBC QN AUTO: 31.9 PG (ref 27–31)
MCHC RBC AUTO-ENTMCNC: 33.8 G/DL (ref 33–36)
MCV RBC AUTO: 94.5 FL (ref 80–94)
MONOCYTES # BLD AUTO: 0.52 X10(3)/MCL (ref 0.1–1.3)
MONOCYTES NFR BLD AUTO: 8 %
MUCOUS THREADS URNS QL MICRO: ABNORMAL /LPF
NEUTROPHILS # BLD AUTO: 3.97 X10(3)/MCL (ref 2.1–9.2)
NEUTROPHILS NFR BLD AUTO: 61 %
NITRITE UR QL STRIP: NEGATIVE
NRBC BLD AUTO-RTO: 0 %
PH UR STRIP: 7.5 [PH]
PLATELET # BLD AUTO: 252 X10(3)/MCL (ref 130–400)
PMV BLD AUTO: 9 FL (ref 7.4–10.4)
POTASSIUM SERPL-SCNC: 4.2 MMOL/L (ref 3.5–5.1)
PROT SERPL-MCNC: 7.7 GM/DL (ref 6.4–8.3)
PROT UR QL STRIP: ABNORMAL
PROT UR STRIP-MCNC: 9.2 MG/DL
RBC # BLD AUTO: 4.01 X10(6)/MCL (ref 4.2–5.4)
RBC #/AREA URNS AUTO: ABNORMAL /HPF
SODIUM SERPL-SCNC: 137 MMOL/L (ref 136–145)
SP GR UR STRIP.AUTO: 1.03 (ref 1–1.03)
SQUAMOUS #/AREA URNS LPF: ABNORMAL /HPF
URINE PROTEIN/CREATININE RATIO (OLG): 0.1
UROBILINOGEN UR STRIP-ACNC: NORMAL
WBC # BLD AUTO: 6.5 X10(3)/MCL (ref 4.5–11.5)
WBC #/AREA URNS AUTO: ABNORMAL /HPF

## 2025-05-08 PROCEDURE — 99214 OFFICE O/P EST MOD 30 MIN: CPT | Mod: PBBFAC | Performed by: INTERNAL MEDICINE

## 2025-05-08 PROCEDURE — 86140 C-REACTIVE PROTEIN: CPT

## 2025-05-08 PROCEDURE — 80053 COMPREHEN METABOLIC PANEL: CPT

## 2025-05-08 PROCEDURE — 1159F MED LIST DOCD IN RCRD: CPT | Mod: CPTII,,, | Performed by: INTERNAL MEDICINE

## 2025-05-08 PROCEDURE — 86160 COMPLEMENT ANTIGEN: CPT | Mod: 59

## 2025-05-08 PROCEDURE — 82570 ASSAY OF URINE CREATININE: CPT

## 2025-05-08 PROCEDURE — 3079F DIAST BP 80-89 MM HG: CPT | Mod: CPTII,,, | Performed by: INTERNAL MEDICINE

## 2025-05-08 PROCEDURE — G2211 COMPLEX E/M VISIT ADD ON: HCPCS | Mod: S$PBB,,, | Performed by: INTERNAL MEDICINE

## 2025-05-08 PROCEDURE — 85652 RBC SED RATE AUTOMATED: CPT

## 2025-05-08 PROCEDURE — 3075F SYST BP GE 130 - 139MM HG: CPT | Mod: CPTII,,, | Performed by: INTERNAL MEDICINE

## 2025-05-08 PROCEDURE — 3008F BODY MASS INDEX DOCD: CPT | Mod: CPTII,,, | Performed by: INTERNAL MEDICINE

## 2025-05-08 PROCEDURE — 99214 OFFICE O/P EST MOD 30 MIN: CPT | Mod: S$PBB,,, | Performed by: INTERNAL MEDICINE

## 2025-05-08 PROCEDURE — 85025 COMPLETE CBC W/AUTO DIFF WBC: CPT

## 2025-05-08 PROCEDURE — 81015 MICROSCOPIC EXAM OF URINE: CPT

## 2025-05-08 PROCEDURE — 4010F ACE/ARB THERAPY RXD/TAKEN: CPT | Mod: CPTII,,, | Performed by: INTERNAL MEDICINE

## 2025-05-08 PROCEDURE — 86160 COMPLEMENT ANTIGEN: CPT

## 2025-05-08 PROCEDURE — 36415 COLL VENOUS BLD VENIPUNCTURE: CPT

## 2025-05-08 RX ORDER — HYDROXYCHLOROQUINE SULFATE 200 MG/1
200 TABLET, FILM COATED ORAL 2 TIMES DAILY
Qty: 60 TABLET | Refills: 6 | Status: SHIPPED | OUTPATIENT
Start: 2025-05-08

## 2025-05-08 RX ORDER — ASPIRIN 325 MG
50000 TABLET, DELAYED RELEASE (ENTERIC COATED) ORAL
COMMUNITY
Start: 2025-04-29

## 2025-05-08 RX ORDER — GABAPENTIN 100 MG/1
CAPSULE ORAL
Qty: 60 CAPSULE | Refills: 11 | Status: SHIPPED | OUTPATIENT
Start: 2025-05-08

## 2025-05-08 RX ORDER — BREXPIPRAZOLE 3 MG/1
1 TABLET ORAL EVERY MORNING
COMMUNITY
Start: 2025-04-24

## 2025-05-08 NOTE — PROGRESS NOTES
Patient ID: [id number]     Chief Complaint: Follow-up (Sjogren syndrome with keratoconjunctivitis/)    HPI:     Maricel Richards is a 44 y.o. female here today for a new patient visit.      She had seen Arnaldo, for elevated LFT's, for 2021. Work up showed positive CONRAD so she was referred to me further eval.     Dr Raya started her on Imuran 50 mg daily. She is tolerating it well with out issues. She never had liver biopsy done and was told she has fatty liver. She was not sure if she had hepatic US or not.   Alcohol intake: 1-2 bloody krystian's in 3-4 months.   C/o generalized hair thinning.   C/o tingling and numbness in bilateral hands, worse at night and with activities, like typing. Tingling and numbness worse in right hand at night.   C/o dry eyes and dry mouth, recently got insurance planning to see dentist.   Lost weight on Munjaro. Stopped taking metformin and Munjauro, as A1c has improved.     Denies history of fevers, rashes, photosensitivity, oral or nasal ulcers, h/o MI, stroke, seizures, h/o PE or DVT, Raynaud's phenomenon, uveitis, malignancies.   Family history of autoimmune disease:none  Pregnancies:2  Miscarriages: none  Smoking: quit around summer 2023. 2 packs a day for 2 years and pack a day for 10 years.       Social History     Tobacco Use   Smoking Status Former    Types: Vaping with nicotine    Start date: 2025    Passive exposure: Current   Smokeless Tobacco Former          -------------------------------------    Anxiety disorder, unspecified    Diabetes mellitus    Fatty liver    High cholesterol    Hypertension    Mental disorder    Sjogren syndrome        Past Surgical History:   Procedure Laterality Date    ABLATION OF ENDOMETRIUM USING RESECTOSCOPE      tonsilectomy      TUBAL LIGATION         Review of patient's allergies indicates:  No Known Allergies    Outpatient Medications Marked as Taking for the 5/8/25 encounter (Office Visit) with Telly Caceres MD   Medication Sig Dispense  Refill    ADDERALL XR 20 mg 24 hr capsule Take 20 mg by mouth every morning.      amLODIPine (NORVASC) 10 MG tablet Take 10 mg by mouth.      atorvastatin (LIPITOR) 10 MG tablet Take 10 mg by mouth once daily.      azaTHIOprine (IMURAN) 50 mg Tab Take 50 mg by mouth once daily.      buPROPion (WELLBUTRIN XL) 300 MG 24 hr tablet Take 300 mg by mouth every morning.      cholecalciferol, vitamin D3, 1,250 mcg (50,000 unit) capsule Take 50,000 Units by mouth every 7 days.      clonazePAM (KLONOPIN) 1 MG tablet Take 1.5 mg by mouth 2 (two) times daily.      losartan (COZAAR) 100 MG tablet Take 100 mg by mouth once daily.      metoprolol succinate (TOPROL-XL) 25 MG 24 hr tablet Take 25 mg by mouth every evening.      REXULTI 3 mg Tab Take 1 tablet by mouth every morning.      traZODone (DESYREL) 100 MG tablet Take 200 mg by mouth every evening.      TRINTELLIX 10 mg Tab Take 1 tablet by mouth every morning.      [DISCONTINUED] hydroxychloroquine (PLAQUENIL) 200 mg tablet TAKE 1 TABLET (200 MG TOTAL) BY MOUTH 2 (TWO) TIMES DAILY. 60 tablet 6    [DISCONTINUED] REXULTI 2 mg Tab Take 1 tablet by mouth once daily.         Social History     Socioeconomic History    Marital status: Single   Tobacco Use    Smoking status: Former     Types: Vaping with nicotine     Start date: 2025     Passive exposure: Current    Smokeless tobacco: Former   Substance and Sexual Activity    Alcohol use: Yes     Comment: social    Drug use: Never    Sexual activity: Not Currently     Partners: Male     Birth control/protection: See Surgical Hx        Family History   Problem Relation Name Age of Onset    Hypertension Mother      Heart disease Mother      ALS Father          Immunization History   Administered Date(s) Administered    COVID-19, MRNA, LN-S, PF (MODERNA FULL 0.5 ML DOSE) 07/14/2021, 08/11/2021    DTP 02/28/1980, 05/01/1980, 08/28/1980, 08/12/1982, 04/27/1986    Hepatitis A, Adult 08/12/2009    Hepatitis B, Pediatric/Adolescent  "11/10/1998, 12/15/1998, 05/18/1999    Influenza - Quadrivalent 10/04/2021, 09/12/2023    Influenza - Quadrivalent - PF *Preferred* (6 months and older) 10/12/2018    Influenza - Trivalent - Fluarix, Flulaval, Fluzone, Afluria - PF 09/19/2024    MMR 04/30/1981, 05/18/1999    Meningococcal Conjugate (MCV4P) 05/03/2012    OPV 02/28/1980, 05/01/1980, 08/28/1980, 08/12/1982, 04/27/1986    Td (ADULT) 11/10/1998    Tdap 06/26/2008    Varicella 05/03/2012       Patient Care Team:  Veronica Joy FNP as PCP - General (Family Medicine)     Subjective:     Constitutional:  Denies chills. Denies fever. Denies night sweats. Denies weight loss.   Ophthalmology: Denies blurred vision. Denies dry eyes. Denies eye pain. Denies Itching and redness.   ENT: Denies oral ulcers. Denies epistaxis. Denies dry mouth. Denies swollen glands.   Endocrine: Admits diabetes. Denies thyroid Problems.   Respiratory: Denies cough. Denies shortness of breath. Denies shortness of breath with exertion. Denies hemoptysis.   Cardiovascular: Denies chest pain at rest. Denies chest pain with exertion. Denies palpitations.    Gastrointestinal: Denies abdominal pain. Denies diarrhea. Denies nausea. Denies vomiting. Denies hematemesis or hematochezia. Denies heartburn.  Genitourinary: Denies blood in urine.  Musculoskeletal: See HPI for details  Integumentary: Denies rash. Denies photosensitivity.   Peripheral Vascular: Denies Ulcers of hands and/or feet. Denies Cold extremities.   Neurologic: Denies dizziness. Denies headache.  Denies loss of strength. Admits numbness or tingling in hands.   Psychiatric: Admits depression. Admits anxiety. Denies suicidal/homicidal ideations.      Objective:     /85 (BP Location: Left arm, Patient Position: Sitting)   Pulse 67   Temp 98 °F (36.7 °C) (Oral)   Resp 20   Ht 5' 5" (1.651 m)   Wt 85.1 kg (187 lb 9.6 oz)   SpO2 98%   BMI 31.22 kg/m²     Physical Exam    General Appearance: alert, pleasant, in no acute " distress.  Skin: Skin color, texture, turgor normal. No rashes or lesions.  Eyes:  extraocular movement intact (EOMI), pupils equal, round, reactive to light and accommodation, conjunctiva clear.  ENT: No oral or nasal ulcers. Dry oral mucosa, no lesions.  Neck:  Neck supple. No adenopathy.   Lungs: CTA throughout without crackles, rhonchi, or wheezes.   Heart: RRR w/o murmurs.  No edema. 2+ DP pulse.  Abdomen: Soft, non-tender, no masses, rebound or guarding.  Neuro: Alert, oriented, CN II-XII GI, sensory and motor innervation intact.  Musculoskeletal: No synovitis on exam. No joint deformities.   Psych: Alert, oriented, normal eye contact.      Labs:     4/2022: CBC ok. , ALT 75. Hep B and C negative. Smooth muscle and mitochondrial ab negative. CONRAD 1:320, speckled. Positive SSA, 112. Negative dsDNA, SSB, RNP, centromere, smith, Scl 70, TPO, chromatin. RF and CCP negative. Cardiolipin IgA 12, normal less than 12. Negative IgG and IgM. C3, C4 ok.     06/10/2024; no protein, 1+ blood in urine.  SPEP and MASSIEL okay.  IgA level slightly high 584, normal IgG and IgM.  Normal IgA level.  CMP okay.  CBC okay.  Upc okay.  IgG 4 subclass normal.  Lupus anticoagulant not detected.  C3, C4 normal.  CRP and ESR normal.  LDH normal.  CPK normal.  Kappa and lambda light chain ratio normal.  Cardiolipin negative.  Beta 2 glycoprotein negative.  Aldolase normal. TPMT normal metabolizer, no mutations.     6/10/24:  Chest x-ray showed lungs are clear.    6/10/24: Schirmer's test: 9 cm in right eye and 10 in left eye.     Assessment:       ICD-10-CM ICD-9-CM   1. Sjogren syndrome with keratoconjunctivitis  M35.01 370.33   2. Primary hypertension  I10 401.9   3. Bipolar affective disorder, remission status unspecified  F31.9 296.80   4. Elevated LFTs  R79.89 790.6   5. High risk medication use  Z79.899 V58.69   6. Type 2 diabetes mellitus without complication, without long-term current use of insulin  E11.9 250.00   7.  Neuropathy  G62.9 355.9          Plan:     1. Sjogren syndrome with keratoconjunctivitis:  Positive CONRAD, SSA.  Other ENAs negative.  Patient has sicca symptoms, hair loss and arthralgia.  Dry oral mucosa on exam, Schirmer's test abnormal.  Schirmer's test: 9 cm in right eye and 10 in left eye on 6/10/24. She has Sjogren's.  Discussed the disease course and treatment options of Sjogren's.  - discussed symptomatic management of sicca symptoms, advised to use biotin mouth wash and [place] Xylimelts.    - advised to schedule follow-up with Ophthalmology- [facility]- scheduled with them in June 2025. She saw dentist but waiting for insurance approval for deep cleaning.  - C/w Plaquenil 200 mg b.i.d..  - labs today.     2. High risk medication use: Persons with rheumatoid arthritis, lupus, psoriatic arthritis and other autoimmune diseases are at increased risk of cardiovascular disease including heart attack and stroke. We recommend that all patients with these conditions have annual health maintenance exams including lipid measurements, blood pressure measurements, and smoking cessation counseling when applicable at their primary care provider's office.   -Advised to stay up-to-date on age appropriate vaccinations and malignancy screening, including yearly skin exams.    - congratulated her on smoking cessation.  Discussed about continued smoking cessation.     3. Primary hypertension:  Blood pressure okay today, controlled with medications.     4. Bipolar affective disorder and depression:  Controlled with medication, follows with PCP.     5. Elevated LFTs:  She was told she has fatty liver.  She had seen Dr. Edmond Salzaar.  Currently on Imuran 50 mg daily, prescribed by GI.  TPMT normal.      7. Type 2 diabetes mellitus:  Lost weight with Munjaro, stopped taking metformin and Munjaro. A1C was normal.  Discussed strict control of diabetes, follow-up with PCP.        Discussed clinical features of Sjogren's syndrome.    Sjogren's syndrome is an autoimmune disease, which is caused by an abnormal overactive immune system attacking the tear duct glands, salivary glands and various other parts of the body.  This can lead to dry eyes, dry mouth, dental caries along with other various symptoms such as neurological problems, joint pains, rashes, hematological abnormalities etc.   Diagnosis is made by evaluation of clinical features, serological abnormalities, and possibly a salivary lip gland biopsy(most specific diagnosis).   Patient's with Sjogren's syndrome are at an increased risk for lymphoma.    Laboratory diagnosis typically includes checking an anti-nuclear antibody, SSA and SSB (sjogren's syndrome A and B) antibody.      Treatments of dry eyes typically involves lubricant eye drops such as restasis drops.  Various treatments of dry mouth include artifical saliva, salivary stimulants (evoxac, salagen), sugar free candy, etc.       Follow up in about 7 months (around 12/8/2025). In addition to their scheduled follow up, the patient has also been instructed to follow up on as needed basis.      Total time spent with patient and documentation is 35 minutes. All questions were answered to patient's satisfaction and patient verbalized understanding.

## 2025-05-09 LAB
C3 SERPL-MCNC: 116 MG/DL (ref 80–173)
C4 SERPL-MCNC: 23 MG/DL (ref 13–46)